# Patient Record
Sex: FEMALE | Race: WHITE | NOT HISPANIC OR LATINO | Employment: UNEMPLOYED | URBAN - METROPOLITAN AREA
[De-identification: names, ages, dates, MRNs, and addresses within clinical notes are randomized per-mention and may not be internally consistent; named-entity substitution may affect disease eponyms.]

---

## 2019-07-06 ENCOUNTER — OFFICE VISIT (OUTPATIENT)
Dept: URGENT CARE | Facility: CLINIC | Age: 39
End: 2019-07-06
Payer: COMMERCIAL

## 2019-07-06 VITALS
TEMPERATURE: 97.2 F | HEART RATE: 98 BPM | OXYGEN SATURATION: 96 % | WEIGHT: 180 LBS | DIASTOLIC BLOOD PRESSURE: 82 MMHG | HEIGHT: 64 IN | SYSTOLIC BLOOD PRESSURE: 128 MMHG | RESPIRATION RATE: 18 BRPM | BODY MASS INDEX: 30.73 KG/M2

## 2019-07-06 DIAGNOSIS — K08.89 PAIN, DENTAL: Primary | ICD-10-CM

## 2019-07-06 PROCEDURE — 99204 OFFICE O/P NEW MOD 45 MIN: CPT | Performed by: PHYSICIAN ASSISTANT

## 2019-07-06 PROCEDURE — G0383 LEV 4 HOSP TYPE B ED VISIT: HCPCS | Performed by: PHYSICIAN ASSISTANT

## 2019-07-06 PROCEDURE — 99284 EMERGENCY DEPT VISIT MOD MDM: CPT | Performed by: PHYSICIAN ASSISTANT

## 2019-07-06 RX ORDER — AMOXICILLIN 500 MG/1
500 TABLET, FILM COATED ORAL 3 TIMES DAILY
Qty: 30 TABLET | Refills: 0 | Status: SHIPPED | OUTPATIENT
Start: 2019-07-06 | End: 2019-07-16

## 2019-07-06 NOTE — PROGRESS NOTES
[email or web address] [facility]        NAME: Dee Castillo is a 41 y o  female  : 1980    MRN: [id number]  DATE: 2019  TIME: 3:45 PM    Assessment and Plan   Pain, dental [K08 89]  1  Pain, dental  amoxicillin (AMOXIL) 500 MG tablet         Patient Instructions     1  Dental pain  -Take amoxicillin as directed  -tylenol/motrin  -follow up with a dentist for re-evaluation as soon as possible    Go to the emergency room with worsening pain, worsening swelling, fever, difficulty swallowing/breathing or any new concerns    Chief Complaint     Chief Complaint   Patient presents with    Dental Pain     bottom R tooth pain x 1 day  History of Present Illness       The patient presents today for an evaluation of right lower tooth pain that started yesterday  Patient rates her pain as a 2/10  The patient has also had increased swelling of her cheek  She is concerned about infection  No fever  Dental Pain    Pertinent negatives include no fever  Review of Systems   Review of Systems   Constitutional: Negative for chills and fever  HENT: Positive for dental problem  Negative for ear pain, rhinorrhea, sore throat and trouble swallowing  Respiratory: Negative for shortness of breath  Cardiovascular: Negative for chest pain  Gastrointestinal: Negative for vomiting  Neurological: Negative for headaches  All other systems reviewed and are negative          Current Medications       Current Outpatient Medications:     levonorgestrel (MIRENA) 20 MCG/24HR IUD, 1 each by Intrauterine route once, Disp: , Rfl:     METHADONE HCL PO, Take 110 mg by mouth daily, Disp: , Rfl:     amoxicillin (AMOXIL) 500 MG tablet, Take 1 tablet (500 mg total) by mouth 3 (three) times a day for 10 days, Disp: 30 tablet, Rfl: 0    Current Allergies     Allergies as of 2019    (No Known Allergies)            The following portions of the patient's history were reviewed and updated as appropriate: The patient is a 58y Female complaining of chest pain. allergies, current medications, past family history, past medical history, past social history, past surgical history and problem list      Past Medical History:   Diagnosis Date    Anxiety     Depression        History reviewed  No pertinent surgical history  History reviewed  No pertinent family history  Medications have been verified  Objective   /82 (BP Location: Left arm, Patient Position: Sitting)   Pulse 98   Temp (!) 97 2 °F (36 2 °C) (Tympanic)   Resp 18   Ht 5' 4" (1 626 m)   Wt 81 6 kg (180 lb)   SpO2 96%   BMI 30 90 kg/m²        Physical Exam     Physical Exam   Constitutional: She appears well-developed and well-nourished  No distress  HENT:   Head: Normocephalic and atraumatic  Right Ear: Tympanic membrane and ear canal normal    Left Ear: Tympanic membrane and ear canal normal    Nose: Nose normal    Mouth/Throat: Uvula is midline, oropharynx is clear and moist and mucous membranes are normal  Abnormal dentition  Eyes: Pupils are equal, round, and reactive to light  Conjunctivae are normal    Neck: Normal range of motion  Neck supple  Cardiovascular: Normal rate, regular rhythm and normal heart sounds  Pulmonary/Chest: Effort normal and breath sounds normal    Lymphadenopathy:     She has no cervical adenopathy  Neurological: She is alert  Skin: Skin is warm and dry  Psychiatric: She has a normal mood and affect  Nursing note and vitals reviewed

## 2019-12-24 ENCOUNTER — HOSPITAL ENCOUNTER (EMERGENCY)
Facility: HOSPITAL | Age: 39
Discharge: HOME | End: 2019-12-24
Attending: EMERGENCY MEDICINE | Admitting: EMERGENCY MEDICINE
Payer: COMMERCIAL

## 2019-12-24 VITALS
WEIGHT: 170 LBS | DIASTOLIC BLOOD PRESSURE: 73 MMHG | HEART RATE: 88 BPM | RESPIRATION RATE: 16 BRPM | TEMPERATURE: 97.7 F | OXYGEN SATURATION: 98 % | BODY MASS INDEX: 29.18 KG/M2 | SYSTOLIC BLOOD PRESSURE: 124 MMHG

## 2019-12-24 DIAGNOSIS — S02.5XXA FRACTURED TOOTH: ICD-10-CM

## 2019-12-24 DIAGNOSIS — K08.89 DENTALGIA: Primary | ICD-10-CM

## 2019-12-24 PROCEDURE — 99283 EMERGENCY DEPT VISIT LOW MDM: CPT | Performed by: EMERGENCY MEDICINE

## 2019-12-24 PROCEDURE — 99283 EMERGENCY DEPT VISIT LOW MDM: CPT

## 2019-12-24 RX ORDER — AMOXICILLIN 500 MG/1
500 CAPSULE ORAL 3 TIMES DAILY
Qty: 21 CAPSULE | Refills: 0 | Status: SHIPPED | OUTPATIENT
Start: 2019-12-24 | End: 2019-12-31

## 2019-12-25 NOTE — ED PROVIDER NOTES
History  Chief Complaint   Patient presents with   [address] Injury     This is a 68-year-old female chief complaint right-sided dentalgia on the right lower side  Patient has had issues with this tooth repeatedly in the last few months and has been given antibiotics for this  She refuses to see a dentist   She states she does not want a C1 until she is able to receive her Derald Sale for finding  She states this pain started approximately 2 days ago instead we worsened since then  It is worse with eating, better with rest   She is not having fevers, chills, nausea or vomiting  She reports some mild facial swelling around the area that started earlier today  She states pain is not radiating  Prior to Admission Medications   Prescriptions Last Dose Informant Patient Reported? Taking? METHADONE HCL PO  Self Yes Yes   Sig: Take 110 mg by mouth daily   levonorgestrel (MIRENA) 20 MCG/24HR IUD  Self Yes Yes   Si each by Intrauterine route once      Facility-Administered Medications: None       Past Medical History:   Diagnosis Date    Anxiety     Depression        History reviewed  No pertinent surgical history  History reviewed  No pertinent family history  I have reviewed and agree with the history as documented  Social History     Tobacco Use    Smoking status: Light Tobacco Smoker     Packs/day: 0 20     Types: Cigarettes    Smokeless tobacco: Never Used   Substance Use Topics    Alcohol use: Not Currently     Frequency: Never    Drug use: Not Currently        Review of Systems   Constitutional: Negative for activity change, chills, fatigue and fever  HENT: Negative for congestion, sore throat, trouble swallowing and voice change  Eyes: Negative for visual disturbance  Respiratory: Negative for cough, chest tightness and shortness of breath  Cardiovascular: Negative for chest pain  Gastrointestinal: Negative for abdominal pain, diarrhea and vomiting     Genitourinary: Negative for dysuria  Skin: Negative for rash  Neurological: Negative for dizziness, weakness and numbness  Physical Exam  Physical Exam   Constitutional: She is oriented to person, place, and time  She appears well-developed and well-nourished  HENT:   Head: Normocephalic and atraumatic  Extremely poor dentition, tenderness to palpation over the distal most molar on the right lower side which is fractured  Unable to count teeth due to a significant number of missing teeth and gaps  Minimal facial swelling around the area  Eyes: Pupils are equal, round, and reactive to light  Conjunctivae and EOM are normal    Neck: Normal range of motion  Neck supple  Cardiovascular: Normal rate, regular rhythm and normal heart sounds  Pulmonary/Chest: Effort normal and breath sounds normal  No respiratory distress  Abdominal: Soft  Bowel sounds are normal    Musculoskeletal: Normal range of motion  Neurological: She is alert and oriented to person, place, and time  Skin: Skin is warm and dry  Capillary refill takes less than 2 seconds  Psychiatric: She has a normal mood and affect  Her behavior is normal        Vital Signs  ED Triage Vitals [12/24/19 2059]   Temperature Pulse Respirations Blood Pressure SpO2   97 7 °F (36 5 °C) 89 16 124/73 98 %      Temp src Heart Rate Source Patient Position - Orthostatic VS BP Location FiO2 (%)   -- -- -- -- --      Pain Score       No Pain           Vitals:    12/24/19 2059 12/24/19 2125   BP: 124/73 124/73   Pulse: 89 88         Visual Acuity      ED Medications  Medications - No data to display    Diagnostic Studies  Results Reviewed     None                 No orders to display              Procedures  Procedures         ED Course                               MDM  Number of Diagnoses or Management Options  Dentalgia: established and worsening  Fractured tooth: established and worsening  Diagnosis management comments:  This is a 82-year-old female chief complaint dentalgia the setting multiple fractured teeth  Unable to identify specific drainable abscess  Patient is not having any throat swelling, uvula is midline, patient appears clinically well and nontoxic  No airway compromise  Patient started on amoxicillin which she has previously tolerated well  Reiterated the importance of following up with a dentist as this is 3rd time she has been seen for this issue since September  Discussed warning signs and symptoms with the patient as well as when to return to the emergency department versus follow up with [facility]  Patient states understanding and agreement with the plan  Disposition  Final diagnoses:   Dentalgia   Fractured tooth     Time reflects when diagnosis was documented in both MDM as applicable and the Disposition within this note     Time User Action Codes Description Comment    12/24/2019  9:08 PM Talita Vidales Add [D77 94] Dentalgia     12/24/2019  9:08 PM Talita Vidales Add [S02  5XXA] Fractured tooth       ED Disposition     ED Disposition Condition Date/Time Comment    Discharge Stable Tue Dec 24, 2019  9:08 PM [address] discharge to home/self care              Follow-up Information     Follow up With Specialties Details Why Contact Info    Nhung Talavera,  General Practice In 1 day   [address]  [facility] [id number] [place] [address]  Call in 1 day  [address]  [employer] [employer]  [phone]          Discharge Medication List as of 12/24/2019  9:09 PM      START taking these medications    Details   amoxicillin (AMOXIL) 500 mg capsule Take 1 capsule (500 mg total) by mouth 3 (three) times a day for 7 days, Starting Tue 12/24/2019, Until Tue 12/31/2019, Normal         CONTINUE these medications which have NOT CHANGED    Details   levonorgestrel (MIRENA) 20 MCG/24HR IUD 1 each by Intrauterine route once, Historical Med      METHADONE HCL PO Take 110 mg by mouth daily, Historical Med No discharge procedures on file      ED Provider  Electronically Signed by           Jameson Cooks, MD  12/25/19 3045